# Patient Record
Sex: MALE | Race: ASIAN | Employment: FULL TIME | URBAN - METROPOLITAN AREA
[De-identification: names, ages, dates, MRNs, and addresses within clinical notes are randomized per-mention and may not be internally consistent; named-entity substitution may affect disease eponyms.]

---

## 2022-11-24 ENCOUNTER — APPOINTMENT (OUTPATIENT)
Dept: GENERAL RADIOLOGY | Age: 27
End: 2022-11-24
Payer: COMMERCIAL

## 2022-11-24 ENCOUNTER — HOSPITAL ENCOUNTER (EMERGENCY)
Age: 27
Discharge: HOME | End: 2022-11-24
Payer: COMMERCIAL

## 2022-11-24 VITALS
DIASTOLIC BLOOD PRESSURE: 92 MMHG | OXYGEN SATURATION: 98 % | HEIGHT: 70 IN | TEMPERATURE: 98.5 F | RESPIRATION RATE: 16 BRPM | HEART RATE: 82 BPM | BODY MASS INDEX: 23.62 KG/M2 | SYSTOLIC BLOOD PRESSURE: 163 MMHG | WEIGHT: 165 LBS

## 2022-11-24 DIAGNOSIS — M25.571 ACUTE RIGHT ANKLE PAIN: Primary | ICD-10-CM

## 2022-11-24 PROCEDURE — 99283 EMERGENCY DEPT VISIT LOW MDM: CPT

## 2022-11-24 PROCEDURE — 73630 X-RAY EXAM OF FOOT: CPT

## 2022-11-24 PROCEDURE — 6370000000 HC RX 637 (ALT 250 FOR IP): Performed by: REGISTERED NURSE

## 2022-11-24 PROCEDURE — 73610 X-RAY EXAM OF ANKLE: CPT

## 2022-11-24 RX ORDER — AMLODIPINE BESYLATE 5 MG/1
TABLET ORAL
COMMUNITY
Start: 2022-11-20

## 2022-11-24 RX ORDER — IBUPROFEN 800 MG/1
800 TABLET ORAL ONCE
Status: COMPLETED | OUTPATIENT
Start: 2022-11-24 | End: 2022-11-24

## 2022-11-24 RX ADMIN — IBUPROFEN 800 MG: 800 TABLET, FILM COATED ORAL at 19:01

## 2022-11-24 ASSESSMENT — PAIN DESCRIPTION - ORIENTATION: ORIENTATION: RIGHT

## 2022-11-24 ASSESSMENT — ENCOUNTER SYMPTOMS
CONSTIPATION: 0
SORE THROAT: 0
NAUSEA: 0
COLOR CHANGE: 1
RHINORRHEA: 0
SHORTNESS OF BREATH: 0
DIARRHEA: 0
COUGH: 0
BACK PAIN: 0
ABDOMINAL PAIN: 0
VOMITING: 0

## 2022-11-24 ASSESSMENT — PAIN SCALES - GENERAL
PAINLEVEL_OUTOF10: 6
PAINLEVEL_OUTOF10: 6
PAINLEVEL_OUTOF10: 4

## 2022-11-24 ASSESSMENT — PAIN - FUNCTIONAL ASSESSMENT: PAIN_FUNCTIONAL_ASSESSMENT: 0-10

## 2022-11-24 ASSESSMENT — PAIN DESCRIPTION - PAIN TYPE: TYPE: ACUTE PAIN

## 2022-11-24 ASSESSMENT — PAIN DESCRIPTION - DESCRIPTORS: DESCRIPTORS: ACHING

## 2022-11-24 ASSESSMENT — PAIN DESCRIPTION - LOCATION: LOCATION: ANKLE

## 2022-11-24 NOTE — ED PROVIDER NOTES
[facility]  ED  EMERGENCY DEPARTMENT ENCOUNTER        Pt Name: Chad Rico  MRN: [id number]  [place] 1995  Date of evaluation: 11/24/2022  Provider: JOSUE Freitas CNP  PCP: No primary care provider on file. This patient was not seen and evaluated by the attending physician     I have evaluated this patient. My supervising physician was available for consultation. CHIEF COMPLAINT       Chief Complaint   Patient presents with    Ankle Injury     Right. States running after dog and twisted ankle. Denies hitting head        HISTORY OF PRESENT ILLNESS   (Location/Symptom, Timing/Onset, Context/Setting, Quality, Duration, Modifying Factors, Severity)  Note limiting factors. Chad Rico is a 28 y.o. male who presents via private car for right ankle and right foot pain. Onset was today around noon. Duration has been since the onset. Context includes patient presents to the emergency department today with complaints of right ankle pain and right foot pain. He states that he was attempting to keep his dog from running outside and fell down 2 steps rolling and everting his right ankle. He denies falling to the ground or striking his head. He does not take any blood thinners. He is endorsing pain to the outer portion of his right ankle and right foot. He states he is able to bear weight but this does increase pain. He denies any pain to the right lower leg. Quality is dull and aching with radiation to his right ankle and right foot. Alleviating factors include elevation. Aggravating factors include movement, palpation. Pain is 6/10. Nothing has been used for pain today. Chart review reveals pt has significant PMHx of no significant past medical history. They take no medications. Nursing Notes were all reviewed and agreed with or any disagreements were addressed  in the HPI. Pt was seen during the [place] 19 pandemic.  Appropriate PPE worn by ME during patient encounters. Pt seen during a time with constrained hospital bed capacity and other potential inpatient and outpatient resources were constrained due to the viral pandemic. REVIEW OF SYSTEMS    (2-9 systems for level 4, 10 or more for level 5)     Review of Systems   HENT:  Negative for congestion, rhinorrhea and sore throat. Respiratory:  Negative for cough and shortness of breath. Cardiovascular:  Negative for chest pain and leg swelling. Gastrointestinal:  Negative for abdominal pain, constipation, diarrhea, nausea and vomiting. Musculoskeletal:  Positive for arthralgias. Negative for back pain, myalgias, neck pain and neck stiffness. Right ankle pain with bruising and swelling   Skin:  Positive for color change. Negative for rash and wound. Neurological:  Negative for dizziness, light-headedness and headaches. Positives and Pertinent negatives as per HPI. Except as noted abovein the ROS, all other systems were reviewed and negative. PAST MEDICAL HISTORY   History reviewed. No pertinent past medical history. SURGICAL HISTORY   History reviewed. No pertinent surgical history. CURRENTMEDICATIONS       Previous Medications    AMLODIPINE (NORVASC) 5 MG TABLET             ALLERGIES     Amoxicillin-pot clavulanate    FAMILYHISTORY     History reviewed. No pertinent family history.        SOCIAL HISTORY       Social History     Socioeconomic History    Marital status: Single     Spouse name: None    Number of children: None    Years of education: None    Highest education level: None   Tobacco Use    Smoking status: Never    Smokeless tobacco: Never   Substance and Sexual Activity    Alcohol use: Never       SCREENINGS             PHYSICAL EXAM    (up to 7 for level 4, 8 or more for level 5)     ED Triage Vitals [11/24/22 1826]   BP Temp Temp Source Heart Rate Resp SpO2 Height Weight   (!) 163/92 98.5 °F (36.9 °C) Oral 82 16 98 % 5' 10\" (1.778 m) 165 lb (74.8 kg)       Physical Exam  Vitals and nursing note reviewed. Constitutional:       Appearance: Normal appearance. He is not ill-appearing or diaphoretic. HENT:      Head: Normocephalic and atraumatic. Right Ear: External ear normal.      Left Ear: External ear normal.   Eyes:      General:         Right eye: No discharge. Left eye: No discharge. Cardiovascular:      Pulses:           Radial pulses are 2+ on the right side. Dorsalis pedis pulses are 2+ on the right side. Posterior tibial pulses are 2+ on the right side. Pulmonary:      Effort: Pulmonary effort is normal. No respiratory distress. Musculoskeletal:      Cervical back: Normal range of motion and neck supple. Right lower leg: No deformity, lacerations, tenderness or bony tenderness. No edema. Left lower leg: No edema. Right ankle: Swelling and ecchymosis present. No deformity or lacerations. Tenderness present over the lateral malleolus and base of 5th metatarsal. No medial malleolus tenderness. Decreased range of motion. Right Achilles Tendon: Normal.      Right foot: Decreased range of motion. Normal capillary refill. Swelling, tenderness and bony tenderness present. No deformity, bunion, Charcot foot, foot drop, prominent metatarsal heads, laceration or crepitus. Normal pulse. Legs:       Comments: Posterior compartments of right lower leg and calf are soft and compressible without tenderness, swelling or warmth. Pulses, capillary refill, sensation intact and equal bilaterally in feet. Area of swelling and tenderness to the lateral malleolus extending to the fifth metatarsal space onto the foot. No obvious deformity   Skin:     General: Skin is warm and dry. Capillary Refill: Capillary refill takes less than 2 seconds. Neurological:      General: No focal deficit present. Mental Status: He is alert and oriented to person, place, and time. GCS: GCS eye subscore is 4.  GCS verbal subscore is 5. GCS motor subscore is 6. Psychiatric:         Mood and Affect: Mood normal.         Behavior: Behavior normal.     PHYSICAL EXAM  BP (!) 163/92   Pulse 82   Temp 98.5 °F (36.9 °C) (Oral)   Resp 16   Ht 5' 10\" (1.778 m)   Wt 165 lb (74.8 kg)   SpO2 98%   BMI 23.68 kg/m²       DIAGNOSTIC RESULTS   LABS:    Labs Reviewed - No data to display    All other labs were within normal range or not returned as of this dictation. EKG: All EKG's are interpreted by the Emergency Department Physician who either signs orCo-signs this chart in the absence of a cardiologist.  Please see their note for interpretation of EKG. RADIOLOGY:   Non-plain film images such as CT, Ultrasound and MRI are read by the radiologist. Plain radiographic images are visualized andpreliminarily interpreted by the  ED Provider with the below findings:        Interpretation perthe Radiologist below, if available at the time of this note:    XR FOOT RIGHT (MIN 3 VIEWS)   Final Result   No acute osseous abnormality of the right foot evident. XR ANKLE RIGHT (MIN 3 VIEWS)   Final Result   No acute abnormality of the right ankle. No results found. PROCEDURES   Unless otherwise noted below, none     Procedures    CRITICAL CARE TIME   N/A    CONSULTS:  None      EMERGENCY DEPARTMENT COURSE and DIFFERENTIALDIAGNOSIS/MDM:   Vitals:    Vitals:    11/24/22 1826   BP: (!) 163/92   Pulse: 82   Resp: 16   Temp: 98.5 °F (36.9 °C)   TempSrc: Oral   SpO2: 98%   Weight: 165 lb (74.8 kg)   Height: 5' 10\" (1.778 m)       Patient was given thefollowing medications:  Medications   ibuprofen (ADVIL;MOTRIN) tablet 800 mg (800 mg Oral Given 11/24/22 1901)       PDMP Monitoring:    Last PDMP Shashank as Reviewed [facility]):  Review User Review Instant Review Result            Urine Drug Screenings (1 yr)    No resulted procedures found.        Medication Contract and Consent for Opioid Use Documents Filed        No documents found MDM:   This patient was seen and evaluated by myself. He presents to the emergency department today with complaints of right-sided ankle and foot pain status post mechanical fall this afternoon. On exam he is alert and oriented, hemodynamically stable and nontoxic in appearance. He will have a work-up in the emergency department consisting of imaging and be given ibuprofen for pain. Images were evaluated and reviewed with the patient. X-ray right ankle interpreted by the radiologist for no acute abnormality of the right ankle. The ankle mortise is symmetric and intact with no evidence of acute fracture, dislocation or subluxation. X-ray right foot interpreted by the radiologist for no acute osseous abnormality of the right foot. There is a hallux valgus deformity but otherwise normal alignment of the right foot. No acute fracture or dislocation no radiopaque foreign body identified. On reevaluation the patient did report improvement of his pain after receiving ibuprofen. I discussed with him placing him in an orthopedic boot as he does have anatomic tenderness to the fifth metatarsal space with swelling and bruising overlying. He was agreeable to this plan. He will be provided with a referral for orthopedics. I discussed with him treatment of pain with over-the-counter Tylenol and ibuprofen as well as rest ice and elevation. He was instructed to wear the boot until he follows up with orthopedics. He was provided with strict return precautions for the emergency department including but not limited to worsening pain, changes in sensation such as numbness or tingling, calf pain swelling or tenderness, chest pain shortness of breath or other concerns. He verbalized understanding of all discharge teaching and was ultimately discharged in a stable condition with all questions answered. Is this patient to be included in the SEP-1 Core Measure due to severe sepsis or septic shock?    No Exclusion criteria - the patient is NOT to be included for SEP-1 Core Measure due to: Infection is not suspected    Discharge Time out:  CC Reviewed Yes   Test Results Yes     Vitals:    11/24/22 1826   BP: (!) 163/92   Pulse: 82   Resp: 16   Temp: 98.5 °F (36.9 °C)   SpO2: 98%              FINAL IMPRESSION      1.  Acute right ankle pain          DISPOSITION/PLAN   DISPOSITION Decision To Discharge 11/24/2022 07:53:08 PM      PATIENT REFERREDTO:  [facility]  ED  [address]  [place] [address]  [phone]    As needed, If symptoms worsen    [facility]) Pre-Services  [address] [facility], [address] [place] [address] Drive  [facility]  [phone]          DISCHARGE MEDICATIONS:  New Prescriptions    No medications on file       DISCONTINUED MEDICATIONS:  Discontinued Medications    No medications on file              (Please note that portions ofthis note were completed with a voice recognition program.  Efforts were made to edit the dictations but occasionally words are mis-transcribed.)    JOSUE Carrion CNP (electronically signed)        JOSUE Carrion CNP  11/24/22 1956

## 2022-11-24 NOTE — Clinical Note
Nikki Mckeon was seen and treated in our emergency department on 11/24/2022. He may return to work on . Please allow Skyler Beauchamp to wear the orthopedic boot during his shifts until he has been evaluated and cleared by orthopedics. If you have any questions or concerns, please don't hesitate to call.       Caitlin Morse, APRN - CNP

## 2022-11-25 NOTE — DISCHARGE INSTRUCTIONS
Continue to alternate Tylenol and ibuprofen as needed for pain. Please wear your boot until you follow-up with orthopedics.

## 2022-11-28 ENCOUNTER — OFFICE VISIT (OUTPATIENT)
Dept: ORTHOPEDIC SURGERY | Age: 27
End: 2022-11-28
Payer: COMMERCIAL

## 2022-11-28 VITALS — WEIGHT: 165 LBS | BODY MASS INDEX: 23.62 KG/M2 | HEIGHT: 70 IN

## 2022-11-28 DIAGNOSIS — S93.601A FOOT SPRAIN, RIGHT, INITIAL ENCOUNTER: Primary | ICD-10-CM

## 2022-11-28 PROCEDURE — 99203 OFFICE O/P NEW LOW 30 MIN: CPT | Performed by: PHYSICIAN ASSISTANT

## 2022-11-28 NOTE — LETTER
[address] [place] [id number]  Phone: [phone]  Fax: [phone]    Jamilah De La Paz        November 28, 2022     Patient: Mian Veras   YOB: 1995   Date of Visit: 11/28/2022       To Whom It May Concern: It is my medical opinion that Mian Veras may return to work on 11/29/2022 with no restrictions. If you have any questions or concerns, please don't hesitate to call.     Sincerely,        Ninoska Mo PA-C

## 2022-11-28 NOTE — PROGRESS NOTES
Chief Complaint    Foot Pain (right)      History of Present Illness:  Jorge Brown is a 28 y.o. male here for evaluation chief complaint of foot injury. Patient states that on 11/24/2022 he went out a door to catch a dog missed a step and caused an inversion injury to his right foot. He was seen in [facility], ED where x-rays of the foot and ankle were obtained which showed no acute fractures. He was placed in a short pneumatic walking boot that he is worn since the injury. He states at the present time he has no pain within the foot or ankle with ambulation. He states he has been ambulating outside the boot for bathing. Denies any previous injuries to the right foot and he is ambulating without a limp. Medical History:  Patient's medications, allergies, past medical, surgical, social and family histories were reviewed and updated as appropriate. Review of Systems:  Pertinent items are noted in HPI  Review of systems reviewed from Patient History Form dated on 11/28/2022 and available in the patient's chart under the Media tab. Vital Signs: There were no vitals taken for this visit. General Exam:   Constitutional: Patient is adequately groomed with no evidence of malnutrition  DTRs: Deep tendon reflexes are intact  Mental Status: The patient is oriented to time, place and person. The patient's mood and affect are appropriate. Lymphatic: The lymphatic examination bilaterally reveals all areas to be without enlargement or induration. Right foot Examination:    Inspection: Days inspection of right foot reveals skin to be intact with resolving ecchymosis noted over the base of the right fifth metatarsal.  There is no swelling noted within the ankle. Palpation: There is only mild diffuse tenderness to palpation over the base of the fifth metatarsal into the midfoot. Range of Motion: Patient has a full range of motion of foot and ankle without pain    Strength:  There are no strength deficits noted upon testing    Special Tests: Negative anterior drawer    Skin: There are no rashes, ulcerations or lesions. Gait: Without assistive device or limp      Radiology:     X-rays obtained on 11/24/2022 at [facility], ED were independently reviewed with the patient which shows no acute fractures within the right foot or ankle. Assessment : Right foot sprain    Impression:  Right foot sprain    Office Procedures:  No orders of the defined types were placed in this encounter. Treatment Plan:  The etiology of right foot sprain and it's appropriate treatment were discussed in great detail. Patient is going to obtain a foot and ankle support that he does wear at all times with his weightbearing activities for the next 1 to 2 weeks while working as a . He is to continue ice and may take over-the-counter medication as needed for discomfort. He was given a work note stating he may return to work full duty on 811/29/22.     Olivia Costello PA-C  Board certified by the [facility] [facility]  '